# Patient Record
Sex: FEMALE | Race: WHITE | Employment: OTHER | URBAN - METROPOLITAN AREA
[De-identification: names, ages, dates, MRNs, and addresses within clinical notes are randomized per-mention and may not be internally consistent; named-entity substitution may affect disease eponyms.]

---

## 2020-08-06 ENCOUNTER — HOSPITAL ENCOUNTER (EMERGENCY)
Age: 80
Discharge: HOME | End: 2020-08-06
Attending: STUDENT IN AN ORGANIZED HEALTH CARE EDUCATION/TRAINING PROGRAM
Payer: MEDICARE

## 2020-08-06 ENCOUNTER — APPOINTMENT (OUTPATIENT)
Dept: CT IMAGING | Age: 80
End: 2020-08-06
Payer: MEDICARE

## 2020-08-06 VITALS
SYSTOLIC BLOOD PRESSURE: 154 MMHG | HEIGHT: 59 IN | OXYGEN SATURATION: 95 % | TEMPERATURE: 98.5 F | BODY MASS INDEX: 28.22 KG/M2 | HEART RATE: 58 BPM | DIASTOLIC BLOOD PRESSURE: 58 MMHG | WEIGHT: 140 LBS | RESPIRATION RATE: 14 BRPM

## 2020-08-06 PROCEDURE — 12001 RPR S/N/AX/GEN/TRNK 2.5CM/<: CPT

## 2020-08-06 PROCEDURE — 2500000003 HC RX 250 WO HCPCS: Performed by: STUDENT IN AN ORGANIZED HEALTH CARE EDUCATION/TRAINING PROGRAM

## 2020-08-06 PROCEDURE — 99285 EMERGENCY DEPT VISIT HI MDM: CPT

## 2020-08-06 PROCEDURE — 93005 ELECTROCARDIOGRAM TRACING: CPT | Performed by: STUDENT IN AN ORGANIZED HEALTH CARE EDUCATION/TRAINING PROGRAM

## 2020-08-06 PROCEDURE — 70450 CT HEAD/BRAIN W/O DYE: CPT

## 2020-08-06 PROCEDURE — 72125 CT NECK SPINE W/O DYE: CPT

## 2020-08-06 PROCEDURE — 6370000000 HC RX 637 (ALT 250 FOR IP): Performed by: STUDENT IN AN ORGANIZED HEALTH CARE EDUCATION/TRAINING PROGRAM

## 2020-08-06 RX ORDER — ESCITALOPRAM OXALATE 10 MG/1
10 TABLET ORAL DAILY
COMMUNITY

## 2020-08-06 RX ORDER — DICYCLOMINE HYDROCHLORIDE 10 MG/1
10 CAPSULE ORAL
COMMUNITY

## 2020-08-06 RX ORDER — DILTIAZEM HYDROCHLORIDE 120 MG/1
120 CAPSULE, EXTENDED RELEASE ORAL 2 TIMES DAILY
COMMUNITY

## 2020-08-06 RX ORDER — LIDOCAINE HYDROCHLORIDE 10 MG/ML
5 INJECTION, SOLUTION EPIDURAL; INFILTRATION; INTRACAUDAL; PERINEURAL ONCE
Status: COMPLETED | OUTPATIENT
Start: 2020-08-06 | End: 2020-08-06

## 2020-08-06 RX ORDER — MONTELUKAST SODIUM 4 MG/1
1 TABLET, CHEWABLE ORAL 2 TIMES DAILY
COMMUNITY

## 2020-08-06 RX ORDER — ACETAMINOPHEN 325 MG/1
650 TABLET ORAL ONCE
Status: COMPLETED | OUTPATIENT
Start: 2020-08-06 | End: 2020-08-06

## 2020-08-06 RX ORDER — AMIODARONE HYDROCHLORIDE 200 MG/1
200 TABLET ORAL DAILY
COMMUNITY

## 2020-08-06 RX ADMIN — LIDOCAINE HYDROCHLORIDE 5 ML: 10 INJECTION, SOLUTION EPIDURAL; INFILTRATION; INTRACAUDAL; PERINEURAL at 19:20

## 2020-08-06 RX ADMIN — ACETAMINOPHEN 650 MG: 325 TABLET ORAL at 19:19

## 2020-08-06 ASSESSMENT — PAIN SCALES - GENERAL: PAINLEVEL_OUTOF10: 4

## 2020-08-06 NOTE — ED PROVIDER NOTES
HPI:  8/6/20, Time: 4:51 PM EDT         Chelsy Anderson is a [de-identified] y.o. female presenting to the ED for fall and head injury, beginning earlier this afternoon ago. The complaint has been persistent, moderate in severity, and worsened by nothing. Patient states that she was sitting in a low chair and she tried to get up when she lost her balance and fell back. Hit her head on a tile floor. Has a small 1 cm laceration to the left occipital region of her head. She denies any loss of consciousness. Came in because she was having significant bleeding. Patient does take Eliquis. She is denying any headache, visual changes, focal deficits, nausea/vomiting, abdominal pain, chest pain, shortness of breath, or other symptoms. This sounds to be mechanical fall. No other complaints at this time. Patient denies fever/chills, cough, congestion, chest pain, shortness of breath, edema, headache, visual disturbances, focal paresthesias, focal weakness, abdominal pain, nausea, vomiting, diarrhea, constipation, dysuria, hematuria, trauma, neck or back pain or other complaints. ROS:   Pertinent positives and negatives are stated within HPI, all other systems reviewed and are negative.      --------------------------------------------- PAST HISTORY ---------------------------------------------  Past Medical History:  has a past medical history of Arthritis, Depression, Fibromyalgia, Hyperlipidemia, Hypertension, and IBS (irritable bowel syndrome). Past Surgical History:  has a past surgical history that includes Lithotripsy; Colonoscopy; Endoscopy, colon, diagnostic; Dilation and curettage of uterus; Bladder surgery (1970's); eye surgery (Bilateral); Carpal tunnel release (Right); ERCP (03/07/2016); Appendectomy (1950); and Tubal ligation (1987). Social History:  reports that she has quit smoking. She has never used smokeless tobacco. She reports current alcohol use.  She reports that she does not use drugs. Family History: family history is not on file. The patients home medications have been reviewed. Allergies: Ace inhibitors; Amitriptyline; Bupropion; Lipitor [atorvastatin]; Lisinopril; Rosuvastatin; Sulfa antibiotics; Vytorin [ezetimibe-simvastatin]; and Zocor [simvastatin]        ---------------------------------------------------PHYSICAL EXAM--------------------------------------    Constitutional:  Well developed, well nourished, no acute distress, non-toxic appearance   Eyes:  PERRL, conjunctiva normal, EOMI  HENT: 1 cm laceration to the occipital region of patient's left side of her head. Nonbleeding at this time. No midline cervical tenderness on palpation. No reduction in range of motion. Respiratory:  No respiratory distress, normal breath sounds, no rales, no wheezing   Cardiovascular:  Normal rate, normal rhythm, no murmurs, no gallops, no rubs. Radial and DP pulses 2+ bilaterally. GI:  Soft, nondistended, normal bowel sounds, nontender, no organomegaly, no mass, no rebound, no guarding   :  No costovertebral angle tenderness   Musculoskeletal:  No edema, no tenderness, no deformities. Back- no tenderness  Integument:  Well hydrated, no rash. Adequate perfusion. Lymphatic:  No lymphadenopathy noted   Neurologic:  Alert & oriented x 3, CN 2-12 normal, normal motor function, normal sensory function, no focal deficits noted. Psychiatric:  Speech and behavior appropriate       -------------------------------------------------- RESULTS -------------------------------------------------  I have personally reviewed all laboratory and imaging results for this patient. Results are listed below.      LABS:  Results for orders placed or performed during the hospital encounter of 08/06/20   EKG 12 Lead   Result Value Ref Range    Ventricular Rate 58 BPM    Atrial Rate 58 BPM    P-R Interval 184 ms    QRS Duration 80 ms    Q-T Interval 488 ms    QTc Calculation (Bazett) 479 ms    P Axis 37 degrees    R Axis 48 degrees    T Axis 22 degrees       RADIOLOGY:  Interpreted by Radiologist.  [address]   Final Result      1. No acute bleed. 2. A 12 mm densely calcified meningioma right frontal cranial fossa at   the skull base. 3. Moderate chronic small vessel ischemic changes, no definite acute   infarct. CT CERVICAL SPINE WO CONTRAST   Final Result      1. No acute fracture. 2. Diffuse degenerative changes with mild subluxations, mostly mild   stenosis. 3. At C5/6, disc bulge and posterior bone spurs are causing a mild   cord impingement. EKG Interpretation  Interpreted by emergency department physician, Dr. Tripp Cheung: 58  Rhythm: Sinus  Clinical Impression: Sinus bradycardia with no ST elevation or depression. Normal axis. No sign of acute ischemia. Comparison to prior EKG: stable as compared to patient's most recent EKG       ------------------------- NURSING NOTES AND VITALS REVIEWED ---------------------------   The nursing notes within the ED encounter and vital signs as below have been reviewed by myself. BP (!) 154/58   Pulse 58   Temp 98.5 °F (36.9 °C) (Infrared)   Resp 14   Ht 4' 11\" (1.499 m)   Wt 140 lb (63.5 kg)   SpO2 95%   BMI 28.28 kg/m²   Oxygen Saturation Interpretation: Normal    The patients available past medical records and past encounters were reviewed. ------------------------------ ED COURSE/MEDICAL DECISION MAKING----------------------  Medications   lidocaine PF 1 % injection 5 mL (5 mLs Intradermal Given by Other 8/6/20 1920)   acetaminophen (TYLENOL) tablet 650 mg (650 mg Oral Given 8/6/20 1919)           Procedures:  Laceration Repair Procedure Note    Indication: Laceration    Procedure: The patient was placed in the appropriate position and anesthesia around the laceration was obtained by infiltration using 1% Lidocaine without epinephrine. The area was then cleansed using chlorhexidine.

## 2020-08-07 LAB
EKG ATRIAL RATE: 58 BPM
EKG P AXIS: 37 DEGREES
EKG P-R INTERVAL: 184 MS
EKG Q-T INTERVAL: 488 MS
EKG QRS DURATION: 80 MS
EKG QTC CALCULATION (BAZETT): 479 MS
EKG R AXIS: 48 DEGREES
EKG T AXIS: 22 DEGREES
EKG VENTRICULAR RATE: 58 BPM

## 2020-08-07 PROCEDURE — 93010 ELECTROCARDIOGRAM REPORT: CPT | Performed by: INTERNAL MEDICINE

## 2023-01-20 ENCOUNTER — HOSPITAL ENCOUNTER (OUTPATIENT)
Age: 83
Discharge: HOME | End: 2023-01-20
Payer: MEDICARE

## 2023-01-20 LAB
BACTERIA: ABNORMAL /HPF
BILIRUBIN URINE: NEGATIVE
BLOOD, URINE: ABNORMAL
CLARITY: CLEAR
COLOR: YELLOW
GLUCOSE URINE: NEGATIVE MG/DL
KETONES, URINE: NEGATIVE MG/DL
LEUKOCYTE ESTERASE, URINE: ABNORMAL
NITRITE, URINE: NEGATIVE
PH UA: 7 (ref 5–9)
PROTEIN UA: NEGATIVE MG/DL
RBC UA: ABNORMAL /HPF (ref 0–2)
SPECIFIC GRAVITY UA: <=1.005 (ref 1–1.03)
UROBILINOGEN, URINE: 0.2 E.U./DL
WBC UA: ABNORMAL /HPF (ref 0–5)

## 2023-01-20 PROCEDURE — 87088 URINE BACTERIA CULTURE: CPT

## 2023-01-20 PROCEDURE — 81001 URINALYSIS AUTO W/SCOPE: CPT

## 2023-01-22 LAB — URINE CULTURE, ROUTINE: NORMAL

## 2023-01-31 ENCOUNTER — HOSPITAL ENCOUNTER (OUTPATIENT)
Age: 83
Discharge: HOME | End: 2023-02-02

## 2023-01-31 PROCEDURE — 87088 URINE BACTERIA CULTURE: CPT

## 2023-01-31 PROCEDURE — 88112 CYTOPATH CELL ENHANCE TECH: CPT

## 2023-02-02 LAB — URINE CULTURE, ROUTINE: NORMAL

## 2024-09-11 ENCOUNTER — APPOINTMENT (OUTPATIENT)
Dept: GENERAL RADIOLOGY | Age: 84
End: 2024-09-11
Payer: MEDICARE

## 2024-09-11 ENCOUNTER — HOSPITAL ENCOUNTER (EMERGENCY)
Age: 84
Discharge: HOME | End: 2024-09-11
Attending: EMERGENCY MEDICINE
Payer: MEDICARE

## 2024-09-11 ENCOUNTER — APPOINTMENT (OUTPATIENT)
Dept: CT IMAGING | Age: 84
End: 2024-09-11
Payer: MEDICARE

## 2024-09-11 VITALS
DIASTOLIC BLOOD PRESSURE: 65 MMHG | HEIGHT: 59 IN | SYSTOLIC BLOOD PRESSURE: 162 MMHG | HEART RATE: 66 BPM | WEIGHT: 130 LBS | OXYGEN SATURATION: 96 % | BODY MASS INDEX: 26.21 KG/M2 | TEMPERATURE: 98.2 F | RESPIRATION RATE: 16 BRPM

## 2024-09-11 DIAGNOSIS — D32.9 MENINGIOMA (HCC): ICD-10-CM

## 2024-09-11 DIAGNOSIS — S09.90XA CLOSED HEAD INJURY, INITIAL ENCOUNTER: Primary | ICD-10-CM

## 2024-09-11 DIAGNOSIS — S80.11XA CONTUSION OF MULTIPLE SITES OF RIGHT LOWER EXTREMITY, INITIAL ENCOUNTER: ICD-10-CM

## 2024-09-11 PROCEDURE — 70450 CT HEAD/BRAIN W/O DYE: CPT

## 2024-09-11 PROCEDURE — 73610 X-RAY EXAM OF ANKLE: CPT

## 2024-09-11 PROCEDURE — 73502 X-RAY EXAM HIP UNI 2-3 VIEWS: CPT

## 2024-09-11 PROCEDURE — 73552 X-RAY EXAM OF FEMUR 2/>: CPT

## 2024-09-11 PROCEDURE — 72125 CT NECK SPINE W/O DYE: CPT

## 2024-09-11 PROCEDURE — 6370000000 HC RX 637 (ALT 250 FOR IP): Performed by: EMERGENCY MEDICINE

## 2024-09-11 PROCEDURE — 99284 EMERGENCY DEPT VISIT MOD MDM: CPT

## 2024-09-11 RX ORDER — HYDROCODONE BITARTRATE AND ACETAMINOPHEN 5; 325 MG/1; MG/1
1 TABLET ORAL ONCE
Status: COMPLETED | OUTPATIENT
Start: 2024-09-11 | End: 2024-09-11

## 2024-09-11 RX ADMIN — HYDROCODONE BITARTRATE AND ACETAMINOPHEN 1 TABLET: 5; 325 TABLET ORAL at 17:52

## 2024-09-11 ASSESSMENT — ENCOUNTER SYMPTOMS
NAUSEA: 0
SHORTNESS OF BREATH: 0
EYE REDNESS: 0
VOMITING: 0
ABDOMINAL PAIN: 0

## 2024-09-11 ASSESSMENT — PAIN SCALES - GENERAL: PAINLEVEL_OUTOF10: 6

## 2024-10-23 ENCOUNTER — TELEPHONE (OUTPATIENT)
Dept: ENT CLINIC | Age: 84
End: 2024-10-23

## 2024-10-23 NOTE — TELEPHONE ENCOUNTER
New pt Referral Dr Castillo, Unspecified hearing loss, right ear. Patient requesting to be seen sooner than next available. Patient states she has a piece of skin(growth)/wax in ear. Took Debrox drops x2 weeks to soften up with not much relief. Experiencing pain/hearing decrease. Please advise patient appointment recommendations [phone].

## 2024-11-07 ENCOUNTER — OFFICE VISIT (OUTPATIENT)
Dept: ENT CLINIC | Age: 84
End: 2024-11-07
Payer: MEDICARE

## 2024-11-07 VITALS
BODY MASS INDEX: 27.66 KG/M2 | DIASTOLIC BLOOD PRESSURE: 80 MMHG | HEIGHT: 59 IN | HEART RATE: 63 BPM | SYSTOLIC BLOOD PRESSURE: 158 MMHG | WEIGHT: 137.2 LBS

## 2024-11-07 DIAGNOSIS — H92.01 RIGHT EAR PAIN: ICD-10-CM

## 2024-11-07 DIAGNOSIS — H61.21 IMPACTED CERUMEN OF RIGHT EAR: Primary | ICD-10-CM

## 2024-11-07 DIAGNOSIS — H90.3 SENSORINEURAL HEARING LOSS (SNHL) OF BOTH EARS: ICD-10-CM

## 2024-11-07 PROCEDURE — 99203 OFFICE O/P NEW LOW 30 MIN: CPT | Performed by: OTOLARYNGOLOGY

## 2024-11-07 PROCEDURE — 69210 REMOVE IMPACTED EAR WAX UNI: CPT | Performed by: OTOLARYNGOLOGY

## 2024-11-07 PROCEDURE — 1123F ACP DISCUSS/DSCN MKR DOCD: CPT | Performed by: OTOLARYNGOLOGY

## 2024-11-07 PROCEDURE — 1159F MED LIST DOCD IN RCRD: CPT | Performed by: OTOLARYNGOLOGY

## 2024-11-07 RX ORDER — CIPROFLOXACIN AND DEXAMETHASONE 3; 1 MG/ML; MG/ML
4 SUSPENSION/ DROPS AURICULAR (OTIC) 2 TIMES DAILY
Qty: 7.5 ML | Refills: 0 | Status: SHIPPED | OUTPATIENT
Start: 2024-11-07 | End: 2024-11-14

## 2024-11-07 ASSESSMENT — ENCOUNTER SYMPTOMS
RHINORRHEA: 0
SORE THROAT: 0
RESPIRATORY NEGATIVE: 1
ALLERGIC/IMMUNOLOGIC NEGATIVE: 1

## 2024-11-07 NOTE — PROGRESS NOTES
Department of Otolaryngology  Office Consult Note  11/7/24          Subjective:        Chief Complaint:  had concerns including New Patient (NP shwethan right ear hearing loss X/ pt).     Patient ID: Linda Dunlap is a 84 y.o. female.    HPI: Patient presents as  new patient for right hearing loss and pain. Patient reports for the past several months progressive hearing loss from the right ear associated with fullness and pain. Symptoms getting worse with time. She does have tinnitus high pitched non pulsatile that has been unchanged. No loud noise exposure. No surgery to the ear. Did have hx of vertigo, BPPV, treated with maneuver with improvement of symptoms.  Patient was seen within the last 6 months at Dr. Parker's office for evaluation, had Claudette-Hallpike done which was positive and subsequently the Epley maneuver with no further symptoms, was told she had wax on the ears and presents for removal as well.  She did have hearing test at that time and was told she had hearing loss.  She does not have seasonal allergies or significant nasal congestion, she does have some postnasal drainage and rhinorrhea    Review of Systems   Constitutional: Negative.    HENT:  Positive for ear pain and hearing loss. Negative for congestion, ear discharge, rhinorrhea, sneezing and sore throat.    Respiratory: Negative.     Cardiovascular:  Negative for chest pain.   Musculoskeletal: Negative.    Skin: Negative.    Allergic/Immunologic: Negative.    Neurological: Negative.    Psychiatric/Behavioral: Negative.     All other systems reviewed and are negative.        Past Medical History:   Diagnosis Date    Arthritis     osteo    Depression     Fibromyalgia     Hyperlipidemia     Hypertension     IBS (irritable bowel syndrome)      Past Surgical History:   Procedure Laterality Date    APPENDECTOMY  1950    BLADDER SURGERY  1970's    benign growth removed    CARPAL TUNNEL RELEASE Right     COLONOSCOPY      DILATION AND CURETTAGE OF

## 2024-11-21 ENCOUNTER — OFFICE VISIT (OUTPATIENT)
Dept: ENT CLINIC | Age: 84
End: 2024-11-21

## 2024-11-21 VITALS
TEMPERATURE: 98.1 F | HEIGHT: 59 IN | RESPIRATION RATE: 16 BRPM | WEIGHT: 136 LBS | DIASTOLIC BLOOD PRESSURE: 75 MMHG | HEART RATE: 60 BPM | SYSTOLIC BLOOD PRESSURE: 139 MMHG | OXYGEN SATURATION: 97 % | BODY MASS INDEX: 27.42 KG/M2

## 2024-11-21 DIAGNOSIS — H90.3 SENSORINEURAL HEARING LOSS (SNHL) OF BOTH EARS: ICD-10-CM

## 2024-11-21 DIAGNOSIS — H61.21 IMPACTED CERUMEN OF RIGHT EAR: Primary | ICD-10-CM

## 2024-11-21 DIAGNOSIS — H92.01 RIGHT EAR PAIN: ICD-10-CM

## 2024-11-21 ASSESSMENT — ENCOUNTER SYMPTOMS
ALLERGIC/IMMUNOLOGIC NEGATIVE: 1
RESPIRATORY NEGATIVE: 1
RHINORRHEA: 0
SORE THROAT: 0

## 2024-11-21 NOTE — PROGRESS NOTES
No tracheal deviation.   Cardiovascular:      Rate and Rhythm: Normal rate.      Heart sounds: Normal heart sounds.   Pulmonary:      Effort: Pulmonary effort is normal. No respiratory distress.      Breath sounds: Normal breath sounds.   Musculoskeletal:      Cervical back: Normal range of motion and neck supple.   Lymphadenopathy:      Cervical: No cervical adenopathy.   Skin:     General: Skin is warm and dry.      Capillary Refill: Capillary refill takes less than 2 seconds.   Neurological:      Mental Status: She is alert and oriented to person, place, and time.                 Assessment:       Diagnosis Orders   1. Impacted cerumen of right ear        2. Right ear pain        3. Sensorineural hearing loss (SNHL) of both ears                  Plan:        84-year-old female with history of right hearing loss and right aural fullness with pain.    Right ear much improved today after course of Ciprodex drops, clean dry intact with no evidence of infection.  Audiogram and from [facility] reviewed which shows mild asymmetric hearing loss right greater than left likely due to cerumen impaction.  Advised to keep the ears clean and dry.  Follow-up in few months for reevaluation and repeat hearing test.      This note may be dictated with vocal recognition software. All grammatical or semantic errors should be considered accordingly.     Electronically signed by River Moncada DO on 11/7/24 at3:16 PM EST

## 2024-12-27 NOTE — PROGRESS NOTES
Spoke to pt. She states notified by Dr. Quesada's office that her procedure was changed to [facility] on 1/3/2024. Spoke to Rina at Dr. Quesada's office and pt is to be done at [facility] on 1/3/2024

## 2025-01-02 ENCOUNTER — HOSPITAL ENCOUNTER (OUTPATIENT)
Dept: INPATIENT UNIT | Age: 85
Setting detail: OUTPATIENT SURGERY
Discharge: HOME | End: 2025-01-02

## 2025-05-22 ENCOUNTER — OFFICE VISIT (OUTPATIENT)
Dept: ENT CLINIC | Age: 85
End: 2025-05-22
Payer: MEDICARE

## 2025-05-22 ENCOUNTER — PROCEDURE VISIT (OUTPATIENT)
Dept: AUDIOLOGY | Age: 85
End: 2025-05-22
Payer: MEDICARE

## 2025-05-22 VITALS
HEART RATE: 88 BPM | BODY MASS INDEX: 26.83 KG/M2 | OXYGEN SATURATION: 97 % | SYSTOLIC BLOOD PRESSURE: 160 MMHG | TEMPERATURE: 97.5 F | HEIGHT: 59 IN | DIASTOLIC BLOOD PRESSURE: 98 MMHG | WEIGHT: 133.1 LBS

## 2025-05-22 DIAGNOSIS — H90.3 SENSORINEURAL HEARING LOSS (SNHL) OF BOTH EARS: Primary | ICD-10-CM

## 2025-05-22 DIAGNOSIS — H61.21 IMPACTED CERUMEN OF RIGHT EAR: ICD-10-CM

## 2025-05-22 PROCEDURE — 1123F ACP DISCUSS/DSCN MKR DOCD: CPT | Performed by: OTOLARYNGOLOGY

## 2025-05-22 PROCEDURE — 99213 OFFICE O/P EST LOW 20 MIN: CPT | Performed by: OTOLARYNGOLOGY

## 2025-05-22 PROCEDURE — 1159F MED LIST DOCD IN RCRD: CPT | Performed by: OTOLARYNGOLOGY

## 2025-05-22 PROCEDURE — 92557 COMPREHENSIVE HEARING TEST: CPT

## 2025-05-22 PROCEDURE — 92567 TYMPANOMETRY: CPT

## 2025-05-22 RX ORDER — VALSARTAN 320 MG/1
320 TABLET ORAL
COMMUNITY
Start: 2025-03-30

## 2025-05-22 RX ORDER — APIXABAN 2.5 MG/1
2.5 TABLET, FILM COATED ORAL
COMMUNITY
Start: 2025-04-02

## 2025-05-22 RX ORDER — OMEPRAZOLE 40 MG/1
40 CAPSULE, DELAYED RELEASE ORAL DAILY
COMMUNITY
Start: 2025-03-29

## 2025-05-22 NOTE — PROGRESS NOTES
This patient was referred for audiometric and tympanometric testing by Dr. Moncada due to hearing loss.     Audiometry using pure tone air and bone conduction testing revealed an essentially mild to severe sensorineural hearing loss, bilaterally. Reliability was good. Speech reception thresholds were in good agreement with the pure tone averages, bilaterally. Speech discrimination scores were excellent, bilaterally.    Tympanometry revealed flat tympanograms, in the right ear and shallow tympanogram, in the left ear.    The results were reviewed with the patient and ordering provider.     Recommendations for follow up will be made pending ordering provider consult.    Sid Harris/CCC-A  [place] Lic [id number]  Electronically signed by Sid Harris on 5/22/2025 at 2:35 PM

## 2025-05-22 NOTE — PROGRESS NOTES
Department of Otolaryngology  Office Consult Note      Subjective:        Chief Complaint:  had concerns including Other (OFFICE VISIT - 6 month follow up cerumen; confirmed 5/21 -bml).     Patient ID: Linda Dunlap is a 85 y.o. female.    HPI: Patient presents as  follow-up for right hearing loss and pain.  Patient had significant cerumen impaction of the right ear at the last visit which was removed.  No change in hearing. Ears feels improved today without wax      Review of Systems   Constitutional: Negative.    HENT:  Positive for hearing loss. Negative for congestion, ear discharge, ear pain, rhinorrhea, sneezing and sore throat.    Respiratory: Negative.     Cardiovascular:  Negative for chest pain.   Musculoskeletal: Negative.    Skin: Negative.    Allergic/Immunologic: Negative.    Neurological: Negative.    Psychiatric/Behavioral: Negative.     All other systems reviewed and are negative.        Past Medical History:   Diagnosis Date    Arthritis     osteo    Depression     Fibromyalgia     Hyperlipidemia     Hypertension     IBS (irritable bowel syndrome)      Past Surgical History:   Procedure Laterality Date    APPENDECTOMY  1950    BLADDER SURGERY  1970's    benign growth removed    CARPAL TUNNEL RELEASE Right     COLONOSCOPY      DILATION AND CURETTAGE OF UTERUS      ENDOSCOPY, COLON, DIAGNOSTIC      ERCP  03/07/2016    EYE SURGERY Bilateral     cataract    LITHOTRIPSY      TUBAL LIGATION  1987       Current Outpatient Medications:     valsartan (DIOVAN) 320 MG tablet, 1 tablet, Disp: , Rfl:     omeprazole (PRILOSEC) 40 MG delayed release capsule, Take 1 capsule by mouth daily (Patient taking differently: Take 1 capsule by mouth as needed), Disp: , Rfl:     ELIQUIS 2.5 MG TABS tablet, 1 tablet, Disp: , Rfl:     amiodarone (CORDARONE) 200 MG tablet, Take 1 tablet by mouth daily, Disp: , Rfl:     dilTIAZem (CARDIZEM 12 HR) 120 MG extended release capsule, Take 1 capsule by mouth 2 times daily, Disp: ,